# Patient Record
Sex: FEMALE | Race: OTHER | HISPANIC OR LATINO | Employment: UNEMPLOYED | URBAN - METROPOLITAN AREA
[De-identification: names, ages, dates, MRNs, and addresses within clinical notes are randomized per-mention and may not be internally consistent; named-entity substitution may affect disease eponyms.]

---

## 2020-06-26 ENCOUNTER — HOSPITAL ENCOUNTER (EMERGENCY)
Facility: HOSPITAL | Age: 30
Discharge: HOME | End: 2020-06-26
Attending: EMERGENCY MEDICINE

## 2020-06-26 VITALS
SYSTOLIC BLOOD PRESSURE: 100 MMHG | RESPIRATION RATE: 18 BRPM | HEART RATE: 58 BPM | DIASTOLIC BLOOD PRESSURE: 50 MMHG | TEMPERATURE: 99 F | OXYGEN SATURATION: 97 %

## 2020-06-26 DIAGNOSIS — S00.462A INSECT BITE (NONVENOMOUS) OF LEFT EAR, INITIAL ENCOUNTER: Primary | ICD-10-CM
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
## 2020-06-26 PROCEDURE — 99283 EMERGENCY DEPT VISIT LOW MDM: CPT

## 2020-06-26 PROCEDURE — 99283 EMERGENCY DEPT VISIT LOW MDM: CPT | Mod: 25,,, | Performed by: EMERGENCY MEDICINE

## 2020-06-26 PROCEDURE — 99283 PR EMERGENCY DEPT VISIT,LEVEL III: ICD-10-PCS | Mod: 25,,, | Performed by: EMERGENCY MEDICINE

## 2020-06-26 PROCEDURE — 69200 PR REMV EXT CANAL FOREIGN BODY: ICD-10-PCS | Mod: 53,LT,, | Performed by: EMERGENCY MEDICINE

## 2020-06-26 PROCEDURE — 25000003 PHARM REV CODE 250: Performed by: EMERGENCY MEDICINE

## 2020-06-26 PROCEDURE — 69200 CLEAR OUTER EAR CANAL: CPT | Mod: 53,LT,, | Performed by: EMERGENCY MEDICINE

## 2020-06-26 RX ORDER — OFLOXACIN 3 MG/ML
3 SOLUTION AURICULAR (OTIC) 2 TIMES DAILY
Qty: 42 DROP | Refills: 0 | Status: SHIPPED | OUTPATIENT
Start: 2020-06-26 | End: 2020-07-03

## 2020-06-26 RX ORDER — LIDOCAINE HYDROCHLORIDE 20 MG/ML
10 INJECTION, SOLUTION EPIDURAL; INFILTRATION; INTRACAUDAL; PERINEURAL
Status: COMPLETED | OUTPATIENT
Start: 2020-06-26 | End: 2020-06-26

## 2020-06-26 RX ADMIN — LIDOCAINE HYDROCHLORIDE 200 MG: 20 INJECTION, SOLUTION EPIDURAL; INFILTRATION; INTRACAUDAL; PERINEURAL at 01:06

## 2020-06-26 NOTE — ED NOTES
Patient identifiers verified and correct for Melinda Valleseros   left ear pain, pt has small insect in her ear  LOC: The patient is awake, alert and aware of environment with an appropriate affect, the patient is oriented x 3 and speaking appropriately.   APPEARANCE: Patient appears comfortable and in no acute distress, patient is clean and well groomed.  SKIN: The skin is warm and dry, color consistent with ethnicity, patient has normal skin turgor and moist mucus membranes, skin intact, no breakdown or bruising noted.   MUSCULOSKELETAL: Patient moving all extremities spontaneously, no swelling noted.  RESPIRATORY: Airway is open and patent, respirations are spontaneous, patient has a normal effort and rate, no accessory muscle use noted  CARDIACPatient has a normal rate and regular rhythm, no edema noted, capillary refill < 3 seconds.   GASTRO: Soft and non tender to palpation, no distention noted, normoactive bowel sounds present in all four quadrants. Pt states bowel movements have been regular.  : Pt denies any pain or frequency with urination.  NEURO: Pt opens eyes spontaneously, behavior appropriate to situation, follows commands, facial expression symmetrical, bilateral hand grasp equal and even, purposeful motor response noted, normal sensation in all extremities when touched with a finger.

## 2020-06-26 NOTE — ED NOTES
Patient returned to intake for discharge instructions. Given by MD Markus in [place]. Patient verbalized understanding of discharge instructions.

## 2020-06-26 NOTE — ED PROVIDER NOTES
Encounter Date: 6/26/2020    SCRIBE #1 NOTE: I, Patrizia Estradaang, am scribing for, and in the presence of,  Surya Aparicio MD. I have scribed the entire note.       History     Chief Complaint   Patient presents with    Otalgia     left ear pain since 5am, Turkish speaking pt, language line used.      29 y.o. healthy female reports around 5:30 AM this morning, she woke up with a sensation of a critter going into her left ear. No dizziness. No room spinning. No headache, nausea, or vomiting. No other complaint.     The history is provided by the patient and medical records.     Review of patient's allergies indicates:  No Known Allergies  No past medical history on file.  No past surgical history on file.  No family history on file.  Social History     Tobacco Use    Smoking status: Not on file   Substance Use Topics    Alcohol use: Not on file    Drug use: Not on file     Review of Systems  CONST: No fever, chills, weight change, or fatigue.  HEENT: + Sensation of an insect in her left ear. No headache, blurry vision/change in vision, sore throat, eye pain, otorrhea, rhinorrhea, tooth pain, swelling, or voice changes.  NECK: No pain, masses, trauma, or redness.  HEART: No pain, palpitations, or diaphoresis.  LUNG: No SOB, cough, orthopnea, DYER or other complaints.  ABDOMEN: No pain, nausea, vomiting, diarrhea, constipation, or flank pain.  : No discharge, dysuria, lesions, rashes, masses, sores.  EXTREMITIES: FROM with No swelling, redness, injuries/trauma, lesions, sores, weakness, numbness, or tingling.  NEURO: No dizziness, weakness, fatigue, tremors, headache, change in vision or disturbances of balance or coordination.  SKIN: No lesions, rashes, trauma or other complaints.    Physical Exam     Initial Vitals [06/26/20 1218]   BP Pulse Resp Temp SpO2   (!) 100/50 (!) 58 18 98.9 °F (37.2 °C) 97 %      MAP       --         Physical Exam  GENERAL: Calm; Cooperative; Well-appearing and Non-Toxic; Well-Nourished;  NAD.  HEENT: + insect foreign body in her left ear. AT/NC;   NECK: Supple, FROM with no meningismus, no accessory muscle use. No JVD or Carotid Bruits B/L.  THORAX/BACK: Atraumatic with NTTP. No midline TTP to C/T/LS spine; No CVA tenderness B/L. SLRT NEG.  HEART: Regular rate and rhythm, no M/G/T.  LUNGS: No Tachypnea, No Retractions, and CTA B/L with no W/R/R.  ABDOMEN: +BS, Soft, ND, NTTP. No rigidity. No guarding. NEG Bright's, Rovsing's, or McBurney's point tenderness.  EXTREMITIES: FROM.  SKIN: Warm, Dry, No Skin Tears or Rashes.  VASCULAR: 2+ pulses Prox/Dist & Symmetrical with No delay.  NEUROLOGIC: AAOx3; Answering questions appropriately with no focal deficit. .    ED Course   Procedures  Labs Reviewed - No data to display       Imaging Results    None          Medical Decision Making:   History:   Old Medical Records: I decided to obtain old medical records.  Initial Assessment:   Insect foreign body removal.     F/U:  After lidocaine, I attempted with a liter of sterile water to try to irrigate the insect out without success. I had also tried to remove insect with alligator without success. ENT was called.     With assistance of ENT, cockroach removed without complications.  In sick likely cause slight abrasion.  Was sent home on ofloxacin.  ____________________  Darwin Aparicio MD, [facility]  Emergency Medicine Staff  2:06 PM 6/26/2020              Scribe Attestation:   Scribe #1: I performed the above scribed service and the documentation accurately describes the services I performed. I attest to the accuracy of the note.                          Clinical Impression:   No diagnosis found.      Disposition:   Disposition: Discharged  Condition: Stable                        Surya Aparicio MD  06/28/20 1246

## 2024-02-15 ENCOUNTER — HOSPITAL ENCOUNTER (EMERGENCY)
Facility: HOSPITAL | Age: 34
Discharge: HOME | End: 2024-02-15
Attending: STUDENT IN AN ORGANIZED HEALTH CARE EDUCATION/TRAINING PROGRAM

## 2024-02-15 VITALS
OXYGEN SATURATION: 100 % | RESPIRATION RATE: 19 BRPM | WEIGHT: 136 LBS | TEMPERATURE: 98 F | HEART RATE: 53 BPM | SYSTOLIC BLOOD PRESSURE: 106 MMHG | DIASTOLIC BLOOD PRESSURE: 62 MMHG

## 2024-02-15 DIAGNOSIS — M54.50 ACUTE LEFT-SIDED LOW BACK PAIN, UNSPECIFIED WHETHER SCIATICA PRESENT: Primary | ICD-10-CM

## 2024-02-15 LAB
B-HCG UR QL: NEGATIVE
BILIRUB UR QL STRIP: NEGATIVE
CLARITY UR REFRACT.AUTO: CLEAR
COLOR UR AUTO: YELLOW
CTP QC/QA: YES
GLUCOSE UR QL STRIP: NEGATIVE
HGB UR QL STRIP: NEGATIVE
KETONES UR QL STRIP: NEGATIVE
LEUKOCYTE ESTERASE UR QL STRIP: NEGATIVE
NITRITE UR QL STRIP: NEGATIVE
PH UR STRIP: 6 [PH] (ref 5–8)
PROT UR QL STRIP: NEGATIVE
SP GR UR STRIP: 1.01 (ref 1–1.03)
URN SPEC COLLECT METH UR: NORMAL

## 2024-02-15 PROCEDURE — 81025 URINE PREGNANCY TEST: CPT | Performed by: PHYSICIAN ASSISTANT

## 2024-02-15 PROCEDURE — 25000003 PHARM REV CODE 250: Performed by: PHYSICIAN ASSISTANT

## 2024-02-15 PROCEDURE — 99283 EMERGENCY DEPT VISIT LOW MDM: CPT | Mod: 25

## 2024-02-15 PROCEDURE — 81003 URINALYSIS AUTO W/O SCOPE: CPT | Performed by: PHYSICIAN ASSISTANT

## 2024-02-15 RX ORDER — ACETAMINOPHEN 500 MG
1000 TABLET ORAL
Status: COMPLETED | OUTPATIENT
Start: 2024-02-15 | End: 2024-02-15

## 2024-02-15 RX ORDER — METHOCARBAMOL 500 MG/1
1000 TABLET, FILM COATED ORAL 3 TIMES DAILY PRN
Qty: 20 TABLET | Refills: 0 | Status: SHIPPED | OUTPATIENT
Start: 2024-02-15 | End: 2024-02-20

## 2024-02-15 RX ORDER — IBUPROFEN 600 MG/1
600 TABLET ORAL 3 TIMES DAILY PRN
Qty: 21 TABLET | Refills: 0 | Status: SHIPPED | OUTPATIENT
Start: 2024-02-15 | End: 2024-02-22

## 2024-02-15 RX ORDER — IBUPROFEN 600 MG/1
600 TABLET ORAL
Status: COMPLETED | OUTPATIENT
Start: 2024-02-15 | End: 2024-02-15

## 2024-02-15 RX ADMIN — ACETAMINOPHEN 1000 MG: 500 TABLET ORAL at 05:02

## 2024-02-15 RX ADMIN — IBUPROFEN 600 MG: 600 TABLET, FILM COATED ORAL at 05:02

## 2024-02-15 NOTE — ED PROVIDER NOTES
Encounter Date: 2/15/2024       History     Chief Complaint   Patient presents with    Back Pain     States with  she is having left sided back pain for 1 month. Endorses frequent urination. Denies CP or SOB.      The history is provided by medical records and the patient. The history is limited by a language barrier. A  was used.     Melinda Rico is a 33 y.o. female with no reported medical history presenting to the ED with the chief complaint of back pain.     Reports 1 month of L lower back pain that radiates down the front of her leg to her knee. Pain is constant and denies associated exacerbating factors. She additionally reports suprapubic abdominal pain. Denies dysuria, hematuria, urinary frequency, vaginal bleeding, vaginal discharge, STD concerns. Reports she would like to have gynecology follow-up for tubal ligation reversal. No fever. No b/b dysfunction, history of back surgeries, IVDU.    Review of patient's allergies indicates:  No Known Allergies  No past medical history on file.  No past surgical history on file.  No family history on file.     Review of Systems   Musculoskeletal:  Positive for back pain.       Physical Exam     Initial Vitals [02/15/24 1401]   BP Pulse Resp Temp SpO2   108/76 70 16 98.2 °F (36.8 °C) 100 %      MAP       --         Physical Exam    Constitutional: She appears well-developed and well-nourished. She is not diaphoretic. No distress.   HENT:   Head: Normocephalic and atraumatic.   Mouth/Throat: Oropharynx is clear and moist. No oropharyngeal exudate.   Eyes: Conjunctivae and EOM are normal. Pupils are equal, round, and reactive to light. No scleral icterus.   Neck: Neck supple.   Normal range of motion.  Cardiovascular:  Normal rate and regular rhythm.           Pulmonary/Chest: Breath sounds normal. No respiratory distress. She has no wheezes.   Abdominal: Abdomen is soft. She exhibits no distension. There is no abdominal  tenderness.   No CVA tenderness There is no rebound.   Genitourinary:    Genitourinary Comments: Patient deferred  exam     Musculoskeletal:         General: No tenderness or edema. Normal range of motion.      Cervical back: Normal range of motion and neck supple.     Neurological: She is alert and oriented to person, place, and time. She has normal strength. No sensory deficit.   +SLR LLE  No focal weakness or sensory deficit   Skin: Skin is warm and dry. No rash noted. No erythema.   Psychiatric: She has a normal mood and affect.         ED Course   Procedures  Labs Reviewed   URINALYSIS, REFLEX TO URINE CULTURE    Narrative:     Specimen Source->Urine   POCT URINE PREGNANCY          Imaging Results              X-Ray Lumbar Spine Ap And Lateral (Final result)  Result time 02/15/24 19:46:03      Final result by Markel Wilcox MD (02/15/24 19:46:03)                   Impression:      No acute process.      Electronically signed by: Markel Wilcox MD  Date:    02/15/2024  Time:    19:46               Narrative:    EXAMINATION:  XR LUMBAR SPINE AP AND LATERAL    CLINICAL HISTORY:  lower back pain;    TECHNIQUE:  AP, lateral and spot images were performed of the lumbar spine.    COMPARISON:  None    FINDINGS:  The lumbar alignment is within normal limits.  The vertebral body heights are maintained.  The posterior elements are unremarkable.  The intervertebral disc spaces are within normal limits.    There is no acute fracture or listhesis of the lumbar spine.    The paraspinal soft tissues are within normal limits.                                       Medications   ibuprofen tablet 600 mg (600 mg Oral Given 2/15/24 1715)   acetaminophen tablet 1,000 mg (1,000 mg Oral Given 2/15/24 1740)     Medical Decision Making  33 y.o. female with no reported medical history presenting to the ED c/o 1 month of L lower back pain.     Clinical presentation for back pain is most consistent with lumbar radiculopathy. No red flags  concerning for cauda equina, epidural abscess, ruptured AAA. Will check UA for suprapubic abdominal pain for evaluation of UTI. Denies other  complaints and defers pelvic exam. No hematuria or flank pain and lower suspicion for nephrolithiasis.     Amount and/or Complexity of Data Reviewed  Labs: ordered. Decision-making details documented in ED Course.  Radiology: ordered and independent interpretation performed.    Risk  OTC drugs.  Prescription drug management.       APC / Resident Notes:   XR L spine without acute findings. UA negative for UTI or occult blood. Okay for outpatient management. RX for IBU and Robaxin provided. Outpatient resources provided for f/u. Patient expresses understanding and agreeable to the plan. Return to ED precautions given for new, worsening, or concerning symptoms.                                Clinical Impression:  Final diagnoses:  [M54.50] Acute left-sided low back pain, unspecified whether sciatica present (Primary)          ED Disposition Condition    Discharge Stable          ED Prescriptions       Medication Sig Dispense Start Date End Date Auth. Provider    ibuprofen (ADVIL,MOTRIN) 600 MG tablet Take 1 tablet (600 mg total) by mouth 3 (three) times daily as needed for Pain. 21 tablet 2/15/2024 2/22/2024 Patrick Patel PA-C    methocarbamoL (ROBAXIN) 500 MG Tab Take 2 tablets (1,000 mg total) by mouth 3 (three) times daily as needed. 20 tablet 2/15/2024 2/20/2024 Patrick Patel PA-C          Follow-up Information       Follow up With Specialties Details Why Contact [facility] Surgical Oncology, Orthopedic Surgery, Genetics, Physical Medicine and Rehabilitation, Occupational Therapy, Radiology   [address] [address]  [phone]      [facility]  Call   [address] [address]             Patrick Patel PA-C  02/15/24 9487

## 2024-02-15 NOTE — ED TRIAGE NOTES
Reports L side back pain. Denies n/v    LOC: The patient is awake, alert and aware of environment with an appropriate affect, the patient is oriented x 3 and speaking appropriately.  APPEARANCE: Patient resting comfortably and in no acute distress, patient is clean and well groomed, patient's clothing is properly fastened.  SKIN: The skin is warm and dry, color consistent with ethnicity, patient has normal skin turgor and moist mucus membranes, skin intact, no breakdown or bruising noted.  MUSCULOSKELETAL: Patient moving all extremities spontaneously, no obvious swelling or deformities noted.  RESPIRATORY: Airway is open and patent, respirations are spontaneous, patient has a normal effort and rate, no accessory muscle use noted,  CARDIAC: Patient has a normal rate and regular rhythm, no periphreal edema noted, capillary refill < 3 seconds.  ABDOMEN: Soft and non tender to palpation, no distention noted, normoactive bowel sounds present in all four quadrants.  NEUROLOGIC:  facial expression is symmetrical, patient moving all extremities spontaneously, normal sensation in all extremities when touched with a finger.  Follows all commands appropriately.

## 2024-02-16 NOTE — DISCHARGE INSTRUCTIONS
Utilice la siguiente información de contacto para establecer la atención con un médico de jocelyne de la shashank.  [place] el ibuprofeno y Robaxin recetados según sea necesario para el dolor.    Regrese a la palmer de emergencias si presenta síntomas nuevos, que empeoran o que son preocupantes.

## 2024-08-17 ENCOUNTER — HOSPITAL ENCOUNTER (EMERGENCY)
Facility: HOSPITAL | Age: 34
Discharge: HOME | End: 2024-08-17
Attending: EMERGENCY MEDICINE

## 2024-08-17 VITALS
SYSTOLIC BLOOD PRESSURE: 91 MMHG | TEMPERATURE: 99 F | OXYGEN SATURATION: 98 % | RESPIRATION RATE: 16 BRPM | DIASTOLIC BLOOD PRESSURE: 48 MMHG | WEIGHT: 218.69 LBS | HEIGHT: 61 IN | BODY MASS INDEX: 41.29 KG/M2 | HEART RATE: 58 BPM

## 2024-08-17 DIAGNOSIS — R51.9 NONINTRACTABLE HEADACHE, UNSPECIFIED CHRONICITY PATTERN, UNSPECIFIED HEADACHE TYPE: Primary | ICD-10-CM

## 2024-08-17 LAB
B-HCG UR QL: NEGATIVE
BILIRUB UR QL STRIP: NEGATIVE
BUN SERPL-MCNC: 5 MG/DL (ref 6–30)
CHLORIDE SERPL-SCNC: 103 MMOL/L (ref 95–110)
CLARITY UR REFRACT.AUTO: CLEAR
COLOR UR AUTO: YELLOW
CREAT SERPL-MCNC: 0.7 MG/DL (ref 0.5–1.4)
CTP QC/QA: YES
GLUCOSE SERPL-MCNC: 106 MG/DL (ref 70–110)
GLUCOSE UR QL STRIP: NEGATIVE
HCT VFR BLD CALC: 38 %PCV (ref 36–54)
HCV AB SERPL QL IA: NORMAL
HGB UR QL STRIP: NEGATIVE
HIV 1+2 AB+HIV1 P24 AG SERPL QL IA: NORMAL
KETONES UR QL STRIP: NEGATIVE
LEUKOCYTE ESTERASE UR QL STRIP: NEGATIVE
NITRITE UR QL STRIP: NEGATIVE
PH UR STRIP: 6 [PH] (ref 5–8)
POC IONIZED CALCIUM: 1.25 MMOL/L (ref 1.06–1.42)
POC TCO2 (MEASURED): 23 MMOL/L (ref 23–29)
POTASSIUM BLD-SCNC: 3.3 MMOL/L (ref 3.5–5.1)
PROT UR QL STRIP: NEGATIVE
SAMPLE: ABNORMAL
SODIUM BLD-SCNC: 140 MMOL/L (ref 136–145)
SP GR UR STRIP: 1.02 (ref 1–1.03)
URN SPEC COLLECT METH UR: NORMAL

## 2024-08-17 PROCEDURE — 81003 URINALYSIS AUTO W/O SCOPE: CPT | Performed by: PHYSICIAN ASSISTANT

## 2024-08-17 PROCEDURE — 96361 HYDRATE IV INFUSION ADD-ON: CPT

## 2024-08-17 PROCEDURE — 86803 HEPATITIS C AB TEST: CPT | Performed by: PHYSICIAN ASSISTANT

## 2024-08-17 PROCEDURE — 63600175 PHARM REV CODE 636 W HCPCS: Performed by: PHYSICIAN ASSISTANT

## 2024-08-17 PROCEDURE — 25000003 PHARM REV CODE 250: Performed by: EMERGENCY MEDICINE

## 2024-08-17 PROCEDURE — 87389 HIV-1 AG W/HIV-1&-2 AB AG IA: CPT | Performed by: PHYSICIAN ASSISTANT

## 2024-08-17 PROCEDURE — 96374 THER/PROPH/DIAG INJ IV PUSH: CPT

## 2024-08-17 PROCEDURE — 25000003 PHARM REV CODE 250: Performed by: PHYSICIAN ASSISTANT

## 2024-08-17 PROCEDURE — 99284 EMERGENCY DEPT VISIT MOD MDM: CPT | Mod: 25

## 2024-08-17 PROCEDURE — 80047 BASIC METABLC PNL IONIZED CA: CPT

## 2024-08-17 PROCEDURE — 81025 URINE PREGNANCY TEST: CPT | Performed by: PHYSICIAN ASSISTANT

## 2024-08-17 RX ORDER — POTASSIUM CHLORIDE 20 MEQ/1
40 TABLET, EXTENDED RELEASE ORAL ONCE
Status: COMPLETED | OUTPATIENT
Start: 2024-08-17 | End: 2024-08-17

## 2024-08-17 RX ORDER — POTASSIUM CHLORIDE 20 MEQ/1
40 TABLET, EXTENDED RELEASE ORAL ONCE
Status: DISCONTINUED | OUTPATIENT
Start: 2024-08-17 | End: 2024-08-17

## 2024-08-17 RX ORDER — KETOROLAC TROMETHAMINE 30 MG/ML
10 INJECTION, SOLUTION INTRAMUSCULAR; INTRAVENOUS
Status: COMPLETED | OUTPATIENT
Start: 2024-08-17 | End: 2024-08-17

## 2024-08-17 RX ORDER — ACETAMINOPHEN 500 MG
1000 TABLET ORAL
Status: COMPLETED | OUTPATIENT
Start: 2024-08-17 | End: 2024-08-17

## 2024-08-17 RX ADMIN — KETOROLAC TROMETHAMINE 10 MG: 30 INJECTION, SOLUTION INTRAMUSCULAR; INTRAVENOUS at 02:08

## 2024-08-17 RX ADMIN — POTASSIUM CHLORIDE 40 MEQ: 1500 TABLET, EXTENDED RELEASE ORAL at 02:08

## 2024-08-17 RX ADMIN — SODIUM CHLORIDE, POTASSIUM CHLORIDE, SODIUM LACTATE AND CALCIUM CHLORIDE 1000 ML: 600; 310; 30; 20 INJECTION, SOLUTION INTRAVENOUS at 02:08

## 2024-08-17 RX ADMIN — ACETAMINOPHEN 1000 MG: 500 TABLET ORAL at 02:08

## 2024-08-17 NOTE — DISCHARGE INSTRUCTIONS
Puede matthew ibuprofeno 600 mg (3 pastillas) cada 6 horas según sea necesario para el dolor.    También puedes alternar con 1000 mg de Tylenol 3 veces al día según sea necesario.   [place] un seguimiento con pool médico de atención primaria en los próximos 5 a 7 días si kathleen síntomas no mejoran.  Regrese a la palmer de emergencias de inmediato si desarrolla algún síntoma nuevo o que empeora significativamente, dione un dolor de preet que empeora, dificultad para moverse o sentir los brazos o las piernas, confusión, fiebre, vómitos o cualquier otro síntoma preocupante.    You can take the ibuprofen 600mg (3 pills) every 6 hours as needed for pain.    You can also alternate with 1000 mg of Tylenol 3 times a day as needed.   Follow-up with your primary care physician in the next 5-7 days if your symptoms are not improving.  Return to the ER immediately if you develop any new or significantly worsening symptoms such as worsening headache, difficulty moving or feeling your arms or legs, confusion, fevers, vomiting or any other worrisome symptoms

## 2024-08-17 NOTE — ED NOTES
I-STAT Chem-8+ Results:   Value Reference Range   Sodium 140 136-145 mmol/L   Potassium  3.3 3.5-5.1 mmol/L   Chloride 103  mmol/L   Ionized Calcium 1.25 1.06-1.42 mmol/L   CO2 (measured) 23 23-29 mmol/L   Glucose 106  mg/dL   BUN 5 6-30 mg/dL   Creatinine 0.7 0.5-1.4 mg/dL   Hematocrit 38 36-54%

## 2024-08-17 NOTE — ED TRIAGE NOTES
Melinda Rico, a 33 y.o. female presents to the ED w/ complaint of neck pain 2x 10/10 radiating to whole head, burning eyes, blurred vision, dizziness, AIDEN throbbing to groin. Denies hx of blood clots, blood thinners. Took ibuprofen but pain unrelieved. No pmhx, nka. Denies falls, injury, heavy lifting.    Triage note:  Chief Complaint   Patient presents with    Neck Pain     Neck pain radiating to forehead with warm sensation starting 2 days ago. Has had some dizziness, blurred vision as well. Also stating AIDEN throbbing to groin       Review of patient's allergies indicates:  No Known Allergies  No past medical history on file.

## 2024-08-17 NOTE — ED PROVIDER NOTES
"Encounter Date: 8/17/2024       History     Chief Complaint   Patient presents with    Neck Pain     Neck pain radiating to forehead with warm sensation starting 2 days ago. Has had some dizziness, blurred vision as well. Also stating AIDEN throbbing to groin       33-year-old Wallisian-speaking only female with no significant past medical history presents to the ED with various concerns.  Patient complains of pain that radiates from the left neck/occipital area to the left frontal head.  Symptoms began 2 days ago and have been constant since onset.  Has difficulty describing the quality of the pain.  She reports associated blurry vision that is intermittent and in both eyes.  Patient also complains of a "dizzy" feeling that is spinning in nature.  This only occurs with standing, is brief and resolves spontaneously.  She denies any neck stiffness, fevers, nausea, vomiting.  She has attempted treatment with ibuprofen which completely resolved her symptoms.  Denies any history of similar symptoms.     The history is provided by the patient. The history is limited by a language barrier. A  was used.     Review of patient's allergies indicates:  No Known Allergies  History reviewed. No pertinent past medical history.  History reviewed. No pertinent surgical history.  No family history on file.  Social History     Tobacco Use    Smoking status: Never    Smokeless tobacco: Never   Substance Use Topics    Alcohol use: Not Currently    Drug use: Never     Review of Systems    Physical Exam     Initial Vitals [08/17/24 1336]   BP Pulse Resp Temp SpO2   112/65 73 18 99.1 °F (37.3 °C) 100 %      MAP       --         Physical Exam    Nursing note and vitals reviewed.  Constitutional: She appears well-developed and well-nourished. She is not diaphoretic.  Non-toxic appearance. She does not appear ill. No distress.   HENT:   Head: Normocephalic and atraumatic.   Eyes: Conjunctivae and EOM are normal. Pupils are " equal, round, and reactive to light. No scleral icterus.   Neck: Neck supple.    Full passive range of motion without pain.     Cardiovascular:  Normal rate and regular rhythm.     Exam reveals no gallop and no friction rub.       No murmur heard.  Pulmonary/Chest: Effort normal and breath sounds normal. No accessory muscle usage. No tachypnea. No respiratory distress. She has no decreased breath sounds. She has no wheezes. She has no rhonchi. She has no rales.   Abdominal: She exhibits no distension.   Musculoskeletal:      Cervical back: Full passive range of motion without pain and neck supple.     Neurological: She is alert. She has normal strength. No sensory deficit.   Speech is clear and fluent.  No facial asymmetry.   Skin: No rash noted.   Psychiatric: She has a normal mood and affect. Her behavior is normal.         ED Course   Procedures  Labs Reviewed   ISTAT PROCEDURE - Abnormal       Result Value    POC Glucose 106      POC BUN 5 (*)     POC Creatinine 0.7      POC Sodium 140      POC Potassium 3.3 (*)     POC Chloride 103      POC TCO2 (MEASURED) 23      POC Ionized Calcium 1.25      POC Hematocrit 38      Sample BEV     HIV 1 / 2 ANTIBODY    HIV 1/2 Ag/Ab Non-reactive      Narrative:     Release to patient->Immediate   HEPATITIS C ANTIBODY    Hepatitis C Ab Non-reactive      Narrative:     Release to patient->Immediate   URINALYSIS, REFLEX TO URINE CULTURE    Specimen UA Urine, Clean Catch      Color, UA Yellow      Appearance, UA Clear      pH, UA 6.0      Specific Gravity, UA 1.020      Protein, UA Negative      Glucose, UA Negative      Ketones, UA Negative      Bilirubin (UA) Negative      Occult Blood UA Negative      Nitrite, UA Negative      Leukocytes, UA Negative      Narrative:     Specimen Source->Urine   POCT URINE PREGNANCY    POC Preg Test, Ur Negative       Acceptable Yes            Imaging Results    None          Medications   lactated ringers bolus 1,000 mL (0 mLs  Intravenous Stopped 8/17/24 1534)   acetaminophen tablet 1,000 mg (1,000 mg Oral Given 8/17/24 1438)   ketorolac injection 9.999 mg (9.999 mg Intravenous Given 8/17/24 1439)   potassium chloride SA CR tablet 40 mEq (40 mEq Oral Given 8/17/24 1456)     Medical Decision Making  33-year-old female presents to the ED with left-sided headache that wraps from the occiput to the frontal area with associated intermittent blurry vision and dizziness.  She is afebrile.  Vitals within normal limits.  She has a nonfocal neurologic exam.    Will check i-STAT Chem 8, provide IV fluids, analgesics for headache and reassess.     My differential diagnosis includes but is not limited to:  Migraine, tension headache, dehydration, hypoglycemia.  I have a very low suspicion for emergent intracranial process based on patient's history and exam.  I do not feel that emergent imaging is indicated today.  My highest suspicion for etiology of patient's symptoms is tension headache.    See ED course notes below.      Amount and/or Complexity of Data Reviewed  Labs: ordered. Decision-making details documented in ED Course.    Risk  OTC drugs.  Prescription drug management.               ED Course as of 08/17/24 2141   Sat Aug 17, 2024   1448 POC Potassium(!): 3.3  Mild hypokalemia.  Will give 40 mEq p.o. supplementation [EH]   1612 Patient reassessed.  Reports resolution of her symptoms after ED management.  Feel that she can be discharged in stable condition.  Advised patient to alternate ibuprofen with Tylenol as needed for headache.  Recommended PCP follow-up if symptoms are not improving over the next several days.  ED return precautions given.  Patient voiced understanding and is comfortable with plan. [EH]   1614 BP(!): 91/48  This blood pressure was taken shortly after my reassessment of the patient.  She appears well.  She is afebrile.  Her presenting symptoms are improved.  Do not feel that additional workup is indicated at this time.  [EH]      ED Course User Index  [EH] Molly Marquis PA-C                           Clinical Impression:  Final diagnoses:  [R51.9] Nonintractable headache, unspecified chronicity pattern, unspecified headache type (Primary)          ED Disposition Condition    Discharge Stable          ED Prescriptions    None       Follow-up Information    None          Molly Marquis PA-C  08/17/24 1706

## 2024-11-16 ENCOUNTER — HOSPITAL ENCOUNTER (EMERGENCY)
Facility: HOSPITAL | Age: 34
Discharge: HOME | End: 2024-11-17
Attending: STUDENT IN AN ORGANIZED HEALTH CARE EDUCATION/TRAINING PROGRAM

## 2024-11-16 DIAGNOSIS — N83.209 SIMPLE OVARIAN CYST: ICD-10-CM

## 2024-11-16 DIAGNOSIS — R10.32 LEFT LOWER QUADRANT ABDOMINAL PAIN: Primary | ICD-10-CM

## 2024-11-16 LAB
ALBUMIN SERPL BCP-MCNC: 3.6 G/DL (ref 3.5–5.2)
ALP SERPL-CCNC: 65 U/L (ref 40–150)
ALT SERPL W/O P-5'-P-CCNC: 16 U/L (ref 10–44)
ANION GAP SERPL CALC-SCNC: 9 MMOL/L (ref 8–16)
AST SERPL-CCNC: 20 U/L (ref 10–40)
B-HCG UR QL: NEGATIVE
B-HCG UR QL: NEGATIVE
BASOPHILS # BLD AUTO: 0.01 K/UL (ref 0–0.2)
BASOPHILS NFR BLD: 0.2 % (ref 0–1.9)
BILIRUB SERPL-MCNC: 0.3 MG/DL (ref 0.1–1)
BILIRUB UR QL STRIP: NEGATIVE
BUN SERPL-MCNC: 5 MG/DL (ref 6–20)
CALCIUM SERPL-MCNC: 9.2 MG/DL (ref 8.7–10.5)
CHLORIDE SERPL-SCNC: 104 MMOL/L (ref 95–110)
CLARITY UR REFRACT.AUTO: ABNORMAL
CO2 SERPL-SCNC: 25 MMOL/L (ref 23–29)
COLOR UR AUTO: YELLOW
CREAT SERPL-MCNC: 0.7 MG/DL (ref 0.5–1.4)
CTP QC/QA: YES
DIFFERENTIAL METHOD BLD: NORMAL
EOSINOPHIL # BLD AUTO: 0 K/UL (ref 0–0.5)
EOSINOPHIL NFR BLD: 0.5 % (ref 0–8)
ERYTHROCYTE [DISTWIDTH] IN BLOOD BY AUTOMATED COUNT: 12.7 % (ref 11.5–14.5)
EST. GFR  (NO RACE VARIABLE): >60 ML/MIN/1.73 M^2
GLUCOSE SERPL-MCNC: 96 MG/DL (ref 70–110)
GLUCOSE UR QL STRIP: NEGATIVE
HCT VFR BLD AUTO: 37.7 % (ref 37–48.5)
HGB BLD-MCNC: 13.2 G/DL (ref 12–16)
HGB UR QL STRIP: NEGATIVE
IMM GRANULOCYTES # BLD AUTO: 0.01 K/UL (ref 0–0.04)
IMM GRANULOCYTES NFR BLD AUTO: 0.2 % (ref 0–0.5)
KETONES UR QL STRIP: NEGATIVE
LEUKOCYTE ESTERASE UR QL STRIP: NEGATIVE
LIPASE SERPL-CCNC: 19 U/L (ref 4–60)
LYMPHOCYTES # BLD AUTO: 1.7 K/UL (ref 1–4.8)
LYMPHOCYTES NFR BLD: 41.7 % (ref 18–48)
MCH RBC QN AUTO: 30.6 PG (ref 27–31)
MCHC RBC AUTO-ENTMCNC: 35 G/DL (ref 32–36)
MCV RBC AUTO: 88 FL (ref 82–98)
MONOCYTES # BLD AUTO: 0.4 K/UL (ref 0.3–1)
MONOCYTES NFR BLD: 9.3 % (ref 4–15)
NEUTROPHILS # BLD AUTO: 2 K/UL (ref 1.8–7.7)
NEUTROPHILS NFR BLD: 48.1 % (ref 38–73)
NITRITE UR QL STRIP: NEGATIVE
NRBC BLD-RTO: 0 /100 WBC
PH UR STRIP: 6 [PH] (ref 5–8)
PLATELET # BLD AUTO: 211 K/UL (ref 150–450)
PMV BLD AUTO: 11.2 FL (ref 9.2–12.9)
POTASSIUM SERPL-SCNC: 3.2 MMOL/L (ref 3.5–5.1)
PROT SERPL-MCNC: 7.1 G/DL (ref 6–8.4)
PROT UR QL STRIP: NEGATIVE
RBC # BLD AUTO: 4.31 M/UL (ref 4–5.4)
SODIUM SERPL-SCNC: 138 MMOL/L (ref 136–145)
SP GR UR STRIP: 1.01 (ref 1–1.03)
URN SPEC COLLECT METH UR: ABNORMAL
WBC # BLD AUTO: 4.08 K/UL (ref 3.9–12.7)

## 2024-11-16 PROCEDURE — 25000003 PHARM REV CODE 250: Performed by: STUDENT IN AN ORGANIZED HEALTH CARE EDUCATION/TRAINING PROGRAM

## 2024-11-16 PROCEDURE — 81025 URINE PREGNANCY TEST: CPT | Performed by: STUDENT IN AN ORGANIZED HEALTH CARE EDUCATION/TRAINING PROGRAM

## 2024-11-16 PROCEDURE — 99285 EMERGENCY DEPT VISIT HI MDM: CPT | Mod: 25

## 2024-11-16 PROCEDURE — 63600175 PHARM REV CODE 636 W HCPCS: Performed by: STUDENT IN AN ORGANIZED HEALTH CARE EDUCATION/TRAINING PROGRAM

## 2024-11-16 PROCEDURE — 85025 COMPLETE CBC W/AUTO DIFF WBC: CPT | Performed by: STUDENT IN AN ORGANIZED HEALTH CARE EDUCATION/TRAINING PROGRAM

## 2024-11-16 PROCEDURE — 96374 THER/PROPH/DIAG INJ IV PUSH: CPT

## 2024-11-16 PROCEDURE — 81003 URINALYSIS AUTO W/O SCOPE: CPT | Performed by: STUDENT IN AN ORGANIZED HEALTH CARE EDUCATION/TRAINING PROGRAM

## 2024-11-16 PROCEDURE — 83690 ASSAY OF LIPASE: CPT | Performed by: STUDENT IN AN ORGANIZED HEALTH CARE EDUCATION/TRAINING PROGRAM

## 2024-11-16 PROCEDURE — 80053 COMPREHEN METABOLIC PANEL: CPT | Performed by: STUDENT IN AN ORGANIZED HEALTH CARE EDUCATION/TRAINING PROGRAM

## 2024-11-16 RX ORDER — KETOROLAC TROMETHAMINE 30 MG/ML
15 INJECTION, SOLUTION INTRAMUSCULAR; INTRAVENOUS
Status: COMPLETED | OUTPATIENT
Start: 2024-11-16 | End: 2024-11-16

## 2024-11-16 RX ORDER — DICYCLOMINE HYDROCHLORIDE 10 MG/1
20 CAPSULE ORAL
Status: COMPLETED | OUTPATIENT
Start: 2024-11-16 | End: 2024-11-16

## 2024-11-16 RX ADMIN — DICYCLOMINE HYDROCHLORIDE 20 MG: 10 CAPSULE ORAL at 04:11

## 2024-11-16 RX ADMIN — KETOROLAC TROMETHAMINE 15 MG: 30 INJECTION, SOLUTION INTRAMUSCULAR; INTRAVENOUS at 09:11

## 2024-11-17 VITALS
HEART RATE: 62 BPM | DIASTOLIC BLOOD PRESSURE: 56 MMHG | WEIGHT: 218 LBS | RESPIRATION RATE: 18 BRPM | TEMPERATURE: 98 F | SYSTOLIC BLOOD PRESSURE: 107 MMHG | OXYGEN SATURATION: 99 % | BODY MASS INDEX: 41.19 KG/M2

## 2024-11-17 PROCEDURE — 25500020 PHARM REV CODE 255: Performed by: STUDENT IN AN ORGANIZED HEALTH CARE EDUCATION/TRAINING PROGRAM

## 2024-11-17 PROCEDURE — 25000003 PHARM REV CODE 250: Performed by: STUDENT IN AN ORGANIZED HEALTH CARE EDUCATION/TRAINING PROGRAM

## 2024-11-17 RX ORDER — OXYCODONE HYDROCHLORIDE 5 MG/1
5 TABLET ORAL
Status: COMPLETED | OUTPATIENT
Start: 2024-11-17 | End: 2024-11-17

## 2024-11-17 RX ADMIN — OXYCODONE 5 MG: 5 TABLET ORAL at 12:11

## 2024-11-17 RX ADMIN — IOHEXOL 100 ML: 350 INJECTION, SOLUTION INTRAVENOUS at 12:11

## 2024-11-17 NOTE — PROVIDER PROGRESS NOTES - EMERGENCY DEPT.
Encounter Date: 11/16/2024    ED Physician Progress Notes        Patient is a 34-year-old female with a significant past medical history presenting with crampy LLQ abdominal pain that started 11/15.  Her physical exam revealed left lower quadrant tenderness.  Labs and pelvic ultrasound were ordered.  Labs unremarkable.  Ultrasound with some endocervical fluid and intraperitoneal fluid that could be physiologic or could correlate with cervicitis/PID.  The ordering team felt this was less likely given patient's lack of vaginal discharge and her pelvic exam.  Given that she had a borderline temperature, a CT abdomen and pelvis was ordered for further evaluation as well    Patient was received at sign-out pending CT abdomen and pelvis.  Workup reviewed.  CT abdomen and pelvis resulted, consistent largely with some incidental findings with fibroid and the mild free fluid.  Possible enteritis.  Patient does report that she was feeling better and is ready to go home.  She was given follow up information for OBGYN and safely discharged with strict return precautions.

## 2024-11-17 NOTE — ED NOTES
Pt identifiers Melinda Alba Vallesteroschecked and correct  LOC: The patient is awake, alert, aware of environment with an appropriate affect. Oriented x3, speaking appropriately  APPEARANCE: Pt resting comfortably, in no acute distress, pt is clean and well groomed, clothing properly fastened  SKIN: Skin warm, dry and intact, normal skin turgor, moist mucus membranes  RESPIRATORY: Airway is open and patent, respirations are spontaneous, even and unlabored, normal effort and rate  CARDIAC: Normal rate and rhythm, no peripheral edema noted, capillary refill < 3 seconds, bilateral radial pulses 2+  ABDOMEN: Soft, nontender, nondistended. Bowel sounds present +LLQ abdominal pain   NEUROLOGIC: PERRL, facial expression is symmetrical, patient moving all extremities spontaneously, normal sensation in all extremities when touched with a finger.  Follows all commands appropriately  MUSCULOSKELETAL: No obvious deformities.

## 2024-11-17 NOTE — DISCHARGE INSTRUCTIONS
Diagnosis: Abdominal pain    Tests today showed:   Labs Reviewed   URINALYSIS, REFLEX TO URINE CULTURE - Abnormal       Result Value    Specimen UA Urine, Clean Catch      Color, UA Yellow      Appearance, UA Hazy (*)     pH, UA 6.0      Specific Gravity, UA 1.010      Protein, UA Negative      Glucose, UA Negative      Ketones, UA Negative      Bilirubin (UA) Negative      Occult Blood UA Negative      Nitrite, UA Negative      Leukocytes, UA Negative      Narrative:     Specimen Source->Urine   COMPREHENSIVE METABOLIC PANEL - Abnormal    Sodium 138      Potassium 3.2 (*)     Chloride 104      CO2 25      Glucose 96      BUN 5 (*)     Creatinine 0.7      Calcium 9.2      Total Protein 7.1      Albumin 3.6      Total Bilirubin 0.3      Alkaline Phosphatase 65      AST 20      ALT 16      eGFR >60.0      Anion Gap 9     PREGNANCY TEST, URINE RAPID    Preg Test, Ur Negative      Narrative:     Specimen Source->Urine   CBC W/ AUTO DIFFERENTIAL    WBC 4.08      RBC 4.31      Hemoglobin 13.2      Hematocrit 37.7      MCV 88      MCH 30.6      MCHC 35.0      RDW 12.7      Platelets 211      MPV 11.2      Immature Granulocytes 0.2      Gran # (ANC) 2.0      Immature Grans (Abs) 0.01      Lymph # 1.7      Mono # 0.4      Eos # 0.0      Baso # 0.01      nRBC 0      Gran % 48.1      Lymph % 41.7      Mono % 9.3      Eosinophil % 0.5      Basophil % 0.2      Differential Method Automated     LIPASE    Lipase 19     POCT URINE PREGNANCY    POC Preg Test, Ur Negative       Acceptable Yes       CT Abdomen Pelvis With IV Contrast NO Oral Contrast   Final Result   Abnormal      Abdomen CT and Pelvis CT:      Small bowel findings most compatible with enteritis, of numerous possible etiologies.  Correlate clinically.      Small quantity free intraperitoneal fluid.  No free intraperitoneal air or rim enhancing intraperitoneal abscess.      Endocervical fluid.  Leading DDX includes menstruation or cervicitis.  Correlate  clinically.      Uterus: 2.6 cm fundal mass, likely a fibroid.  Low-attenuation prominence of the endometrial canal, possibly related to proliferative phase endometrium, or endometrial fluid.  Correlate clinically.      Additional observations as detailed in the body of the report.      These results require clinical correlation.      This report was flagged in Epic as abnormal.         Electronically signed by: Richard Mccall   Date:    11/17/2024   Time:    03:30      US Pelvis Complete Non OB   Final Result   Abnormal         Uterus and left ovary suboptimally visualized.      Endocervical fluid and free intraperitoneal fluid in the pelvis.  This combination of findings may be physiologic (such as from menstruation).  However, in the appropriate clinical context, cervicitis and uncomplicated PID would also be among the differential diagnostic considerations.  Correlate clinically.      Simple 2.3 x 1.7 x 1.7 cm right ovarian cyst.  No sonographic evidence of right or left adnexal mass or adnexal torsion.      Polycystic ovarian morphology, including increased ovarian volume bilaterally.  Correlate clinically.      Incidentally visualized urinary bladder wall thickening, for which cystitis would be a leading differential diagnostic consideration.  Correlate clinically.      This report was flagged in Epic as abnormal.         Electronically signed by: Richard Mccall   Date:    11/16/2024   Time:    20:44          Treatments you had today:   Medications   dicyclomine capsule 20 mg (20 mg Oral Given 11/16/24 1659)   ketorolac injection 15 mg (15 mg Intravenous Given 11/16/24 2158)   oxyCODONE immediate release tablet 5 mg (5 mg Oral Given 11/17/24 0036)   iohexoL (OMNIPAQUE 350) injection 100 mL (100 mLs Intravenous Given 11/17/24 0029)       Follow-Up Plan:  - Follow-up with OBGYN.  - Follow-up with primary care doctor within 3 - 5 days  - Additional testing and/or evaluation as directed by your primary  doctor    Return to the Emergency Department for symptoms including but not limited to: worsening symptoms, shortness of breath or chest pain, vomiting with inability to hold down fluids, fevers greater than 100.4°F, passing out/fainting/unconsciousness, or other concerning symptoms.      - Seguimiento con médico de atención primaria dentro de los 3 a 5 días. - Pruebas y/o evaluaciones adicionales según lo indique pool médico de atención primaria. Regrese al Departamento de Emergencias si tiene síntomas que incluyen, entre otros: empeoramiento de los síntomas, dificultad para respirar o dolor en el pecho, vómitos con incapacidad para retener líquidos, fiebre superior a 100.4 °F, desmayos/pérdida de conocimiento u otros síntomas preocupantes.

## 2024-11-17 NOTE — ED PROVIDER NOTES
Encounter Date: 11/16/2024       History     Chief Complaint   Patient presents with    Abdominal Pain     LLQ abd pain since yesterday, denies N/V, diarrhea      The history is limited by a language barrier. A  was used.   Melinda is a 34 YOF presenting with abdominal pain.    Patient reports crampy LLQ abdominal pain that started yesterday. Pain is intermittent. Doesn't radiate to her back. No associated nausea or vomiting. Has been having normal Bms, had 2 while in the waiting room, but were normal and easy to pass. No blood or diarrhea. She denies any recent fever, cough, congestion, chest pain or difficulty breathing. No vaginal or urinary complaints. No other acute concerns.     Review of patient's allergies indicates:  No Known Allergies  History reviewed. No pertinent past medical history.  History reviewed. No pertinent surgical history.  No family history on file.  Social History     Tobacco Use    Smoking status: Never    Smokeless tobacco: Never   Substance Use Topics    Alcohol use: Not Currently    Drug use: Never     Review of Systems    Physical Exam     Initial Vitals [11/16/24 1437]   BP Pulse Resp Temp SpO2   (!) 112/57 86 18 100 °F (37.8 °C) 99 %      MAP       --         Physical Exam    Nursing note and vitals reviewed.  Constitutional: She appears well-developed and well-nourished.   HENT:   Head: Normocephalic and atraumatic.   Nose: Nose normal. Mouth/Throat: Oropharynx is clear and moist.   Eyes: Conjunctivae and EOM are normal. Pupils are equal, round, and reactive to light.   Neck:   Normal range of motion.  Cardiovascular:  Normal rate, regular rhythm, normal heart sounds and intact distal pulses.           No murmur heard.  Pulmonary/Chest: Breath sounds normal. No respiratory distress. She has no wheezes. She has no rhonchi. She has no rales.   Abdominal: Abdomen is soft. Bowel sounds are normal. She exhibits no distension. There is abdominal tenderness (LLQ).    Musculoskeletal:         General: Normal range of motion.      Cervical back: Normal range of motion.     Neurological: She is alert and oriented to person, place, and time.   Skin: Skin is warm and dry. Capillary refill takes less than 2 seconds.   Psychiatric: She has a normal mood and affect.         ED Course   Procedures  Labs Reviewed   URINALYSIS, REFLEX TO URINE CULTURE - Abnormal       Result Value    Specimen UA Urine, Clean Catch      Color, UA Yellow      Appearance, UA Hazy (*)     pH, UA 6.0      Specific Gravity, UA 1.010      Protein, UA Negative      Glucose, UA Negative      Ketones, UA Negative      Bilirubin (UA) Negative      Occult Blood UA Negative      Nitrite, UA Negative      Leukocytes, UA Negative      Narrative:     Specimen Source->Urine   COMPREHENSIVE METABOLIC PANEL - Abnormal    Sodium 138      Potassium 3.2 (*)     Chloride 104      CO2 25      Glucose 96      BUN 5 (*)     Creatinine 0.7      Calcium 9.2      Total Protein 7.1      Albumin 3.6      Total Bilirubin 0.3      Alkaline Phosphatase 65      AST 20      ALT 16      eGFR >60.0      Anion Gap 9     PREGNANCY TEST, URINE RAPID    Preg Test, Ur Negative      Narrative:     Specimen Source->Urine   CBC W/ AUTO DIFFERENTIAL    WBC 4.08      RBC 4.31      Hemoglobin 13.2      Hematocrit 37.7      MCV 88      MCH 30.6      MCHC 35.0      RDW 12.7      Platelets 211      MPV 11.2      Immature Granulocytes 0.2      Gran # (ANC) 2.0      Immature Grans (Abs) 0.01      Lymph # 1.7      Mono # 0.4      Eos # 0.0      Baso # 0.01      nRBC 0      Gran % 48.1      Lymph % 41.7      Mono % 9.3      Eosinophil % 0.5      Basophil % 0.2      Differential Method Automated     LIPASE    Lipase 19     POCT URINE PREGNANCY    POC Preg Test, Ur Negative       Acceptable Yes            Imaging Results              CT Abdomen Pelvis With IV Contrast NO Oral Contrast (In process)                       US Pelvis Complete Non OB  "(Final result)  Result time 11/16/24 20:44:17      Final result by Richard Mcacll MD (11/16/24 20:44:17)                   Impression:        Uterus and left ovary suboptimally visualized.    Endocervical fluid and free intraperitoneal fluid in the pelvis.  This combination of findings may be physiologic (such as from menstruation).  However, in the appropriate clinical context, cervicitis and uncomplicated PID would also be among the differential diagnostic considerations.  Correlate clinically.    Simple 2.3 x 1.7 x 1.7 cm right ovarian cyst.  No sonographic evidence of right or left adnexal mass or adnexal torsion.    Polycystic ovarian morphology, including increased ovarian volume bilaterally.  Correlate clinically.    Incidentally visualized urinary bladder wall thickening, for which cystitis would be a leading differential diagnostic consideration.  Correlate clinically.    This report was flagged in Epic as abnormal.      Electronically signed by: Richard Mccall  Date:    11/16/2024  Time:    20:44               Narrative:    EXAMINATION:  US PELVIS COMPLETE NON OB    CLINICAL HISTORY:  LLQ ovarian torsion w/u;    Additional history: Today's point of care urine pregnancy test is reported "Negative" under the labs tab in the electronic health record.    TECHNIQUE:  Real-time and static multiplanar grayscale imaging of the uterus and adnexa was performed via the transabdominal scanning approach.  In order to better visualize uterine and ovarian detail, multiplanar transvaginal imaging of the uterus and adnexa was then performed.    Limited color Doppler and pulsed Doppler interrogation were also applied.    COMPARISON:  Lumbar spine radiographs 02/15/2024    FINDINGS:  UTERUS:    13.4 x 5.3 x 5.3 cm.    Endometrial stripe 4 mm thick on transvaginal images, but the uterus was suboptimally visualized transvaginally.  On transabdominal imaging, the fundal endometrium demonstrates a thickness of approximately " 1.3 cm.    Endocervical fluid    Cervical nabothian cysts.    2.5 x 2.7 by 2.7 cm anterior uterine mass, likely a fibroid, well-demonstrated only on transabdominal images.    RIGHT OVARY:    4.4 x 2.5 x 2.8 cm.    Calculated volume 16.1 mL *    Normal follicles, plus a 1.7 x 1.7 x 2.3 cm simple cyst.    Pulsed Doppler: Normal low-resistance intra-ovarian blood flow, with resistive index 0.47.    Color Doppler: Normal.    LEFT OVARY:    4.5 x 3.6 x 2.4 cm.    Calculated volume 20.3 mL *    Visualized transabdominally but not well seen transvaginally.    Multiple follicles.    Pulsed Doppler: Normal low-resistance intra-ovarian blood flow, with resistive index 0.47.    Color Doppler: Normal.    MISCELLANEOUS:    There is a small quantity of free intraperitoneal fluid in the posterior cul-de-sac.    Incidentally visualized portions of the urinary bladder demonstrate substantial distension.  Considering this degree of luminal distension, the imaged portions of the bladder wall appear thickened.    * [email or web address]                                       Medications   dicyclomine capsule 20 mg (20 mg Oral Given 11/16/24 1659)   ketorolac injection 15 mg (15 mg Intravenous Given 11/16/24 2158)   oxyCODONE immediate release tablet 5 mg (5 mg Oral Given 11/17/24 0036)   iohexoL (OMNIPAQUE 350) injection 100 mL (100 mLs Intravenous Given 11/17/24 0029)     Medical Decision Making  Melinda is a 34 YOF presenting with abdominal pain. Patient is hemodynamically stable on arrival. She appears comfortable. Exam notable for LLQ tenderness. Differentials at this time include diverticulitis, gastroenteritis, ovarian torsion, ovarian cyst rupture, ectopic pregnancy, pancreatitis, gastritis, nephrolithiasis also considered PID though no vaginal discharge or fever. Plan to get cbc,cmp, lipase, UA, u preg, and pelvic US.    Blood work grossly unremarkable. Neg pregnancy. US shows simple cyst, but no sign of  torsion. Considered possible intermittent torsion, though patient endorses pain during the exam so less likely. Some fluid in the pelvis could suggest ruptured cyst given patient appears to have PCOS. Also considered early appendicitis. Given borderline temp will add CT abd/pelvis.    At time of sign out patient is still pending CT abd/pelvis. Patient was signed out to Dr. Osorio at shift change. Anticipate discharge home with obgyn referral if CT is normal.           ED Course as of 11/17/24 0048   Sat Nov 16, 2024   1705 hCG Qualitative, Urine: Negative [AC]   1718 Urinalysis, Reflex to Urine Culture Urine, Clean Catch(!)  Unremarkable [AC]   1826 Lipase: 19 [AC]   1826 CBC auto differential  Normal [AC]   2052 Discussed findings of ultrasound with the patient, patient confirmed that while ultrasound was being obtained, patient had the left lower quadrant pain, decreasing my suspicion for ovarian torsion as cause of left lower quadrant pain.  Patient reconfirmed that she has not been having any vaginal discharge, dyspareunia, or dysuria, with no leukocytes in UA, is not concern for STI as she has been monogamous with no history of STI, less concern for PID as cause of free fluid seen in ultrasound, with no elevation in LFTs.  Continues with mild left lower quadrant tenderness palpation, no right lower quadrant tenderness to palpation, and no right lower quadrant pain with left lower quadrant palpation, less concern for early appendicitis [LF]   2059 US Pelvis Complete Non OB(!)  Endocervical fluid and free intraperitoneal fluid in the pelvis.  This combination of findings may be physiologic (such as from menstruation).  However, in the appropriate clinical context, cervicitis and uncomplicated PID would also be among the differential diagnostic considerations.  Correlate clinically.     Simple 2.3 x 1.7 x 1.7 cm right ovarian cyst.  No sonographic evidence of right or left adnexal mass or adnexal torsion.      Polycystic ovarian morphology, including increased ovarian volume bilaterally.  Correlate clinically.   [AC]      ED Course User Index  [AC] Acacia Molina MD  [LF] Danielle Pimentel MD                           Clinical Impression:  Final diagnoses:  [R10.32] Left lower quadrant abdominal pain (Primary)                 Acacia Molina MD  Resident  11/17/24 0048

## 2025-01-04 ENCOUNTER — HOSPITAL ENCOUNTER (EMERGENCY)
Facility: HOSPITAL | Age: 35
Discharge: HOME | End: 2025-01-04
Attending: EMERGENCY MEDICINE

## 2025-01-04 VITALS
TEMPERATURE: 98 F | DIASTOLIC BLOOD PRESSURE: 79 MMHG | OXYGEN SATURATION: 100 % | SYSTOLIC BLOOD PRESSURE: 121 MMHG | WEIGHT: 140 LBS | HEART RATE: 53 BPM | RESPIRATION RATE: 12 BRPM | BODY MASS INDEX: 26.45 KG/M2

## 2025-01-04 DIAGNOSIS — R51.9 ACUTE NONINTRACTABLE HEADACHE, UNSPECIFIED HEADACHE TYPE: ICD-10-CM

## 2025-01-04 DIAGNOSIS — R07.9 CHEST PAIN: ICD-10-CM

## 2025-01-04 DIAGNOSIS — M54.50 CHRONIC BILATERAL LOW BACK PAIN WITHOUT SCIATICA: ICD-10-CM

## 2025-01-04 DIAGNOSIS — G89.29 CHRONIC BILATERAL LOW BACK PAIN WITHOUT SCIATICA: ICD-10-CM

## 2025-01-04 DIAGNOSIS — R68.89 FLU-LIKE SYMPTOMS: ICD-10-CM

## 2025-01-04 DIAGNOSIS — R10.30 LOWER ABDOMINAL PAIN: Primary | ICD-10-CM

## 2025-01-04 LAB
ALBUMIN SERPL BCP-MCNC: 3.8 G/DL (ref 3.5–5.2)
ALP SERPL-CCNC: 62 U/L (ref 40–150)
ALT SERPL W/O P-5'-P-CCNC: 17 U/L (ref 10–44)
ANION GAP SERPL CALC-SCNC: 10 MMOL/L (ref 8–16)
AST SERPL-CCNC: 16 U/L (ref 10–40)
B-HCG UR QL: NEGATIVE
BACTERIA #/AREA URNS AUTO: ABNORMAL /HPF
BASOPHILS # BLD AUTO: 0.02 K/UL (ref 0–0.2)
BASOPHILS NFR BLD: 0.4 % (ref 0–1.9)
BILIRUB SERPL-MCNC: 0.4 MG/DL (ref 0.1–1)
BILIRUB UR QL STRIP: NEGATIVE
BUN SERPL-MCNC: 5 MG/DL (ref 6–20)
CALCIUM SERPL-MCNC: 9.4 MG/DL (ref 8.7–10.5)
CHLORIDE SERPL-SCNC: 106 MMOL/L (ref 95–110)
CLARITY UR REFRACT.AUTO: ABNORMAL
CO2 SERPL-SCNC: 23 MMOL/L (ref 23–29)
COLOR UR AUTO: YELLOW
CREAT SERPL-MCNC: 0.7 MG/DL (ref 0.5–1.4)
CTP QC/QA: YES
DIFFERENTIAL METHOD BLD: ABNORMAL
EOSINOPHIL # BLD AUTO: 0 K/UL (ref 0–0.5)
EOSINOPHIL NFR BLD: 0.2 % (ref 0–8)
ERYTHROCYTE [DISTWIDTH] IN BLOOD BY AUTOMATED COUNT: 12.9 % (ref 11.5–14.5)
EST. GFR  (NO RACE VARIABLE): >60 ML/MIN/1.73 M^2
GLUCOSE SERPL-MCNC: 113 MG/DL (ref 70–110)
GLUCOSE UR QL STRIP: NEGATIVE
HCT VFR BLD AUTO: 37.4 % (ref 37–48.5)
HGB BLD-MCNC: 12.4 G/DL (ref 12–16)
HGB UR QL STRIP: ABNORMAL
IMM GRANULOCYTES # BLD AUTO: 0.01 K/UL (ref 0–0.04)
IMM GRANULOCYTES NFR BLD AUTO: 0.2 % (ref 0–0.5)
INFLUENZA A, MOLECULAR: NEGATIVE
INFLUENZA B, MOLECULAR: NEGATIVE
KETONES UR QL STRIP: ABNORMAL
LEUKOCYTE ESTERASE UR QL STRIP: ABNORMAL
LIPASE SERPL-CCNC: 22 U/L (ref 4–60)
LYMPHOCYTES # BLD AUTO: 2.5 K/UL (ref 1–4.8)
LYMPHOCYTES NFR BLD: 54.7 % (ref 18–48)
MCH RBC QN AUTO: 29.7 PG (ref 27–31)
MCHC RBC AUTO-ENTMCNC: 33.2 G/DL (ref 32–36)
MCV RBC AUTO: 90 FL (ref 82–98)
MICROSCOPIC COMMENT: ABNORMAL
MONOCYTES # BLD AUTO: 0.3 K/UL (ref 0.3–1)
MONOCYTES NFR BLD: 6.2 % (ref 4–15)
NEUTROPHILS # BLD AUTO: 1.7 K/UL (ref 1.8–7.7)
NEUTROPHILS NFR BLD: 38.3 % (ref 38–73)
NITRITE UR QL STRIP: NEGATIVE
NRBC BLD-RTO: 0 /100 WBC
PH UR STRIP: 7 [PH] (ref 5–8)
PLATELET # BLD AUTO: 228 K/UL (ref 150–450)
PMV BLD AUTO: 11 FL (ref 9.2–12.9)
POTASSIUM SERPL-SCNC: 3.4 MMOL/L (ref 3.5–5.1)
PROT SERPL-MCNC: 8.1 G/DL (ref 6–8.4)
PROT UR QL STRIP: ABNORMAL
RBC # BLD AUTO: 4.17 M/UL (ref 4–5.4)
RBC #/AREA URNS AUTO: 3 /HPF (ref 0–4)
SODIUM SERPL-SCNC: 139 MMOL/L (ref 136–145)
SP GR UR STRIP: 1.02 (ref 1–1.03)
SPECIMEN SOURCE: NORMAL
SQUAMOUS #/AREA URNS AUTO: 23 /HPF
TROPONIN I SERPL DL<=0.01 NG/ML-MCNC: <3 NG/L (ref 0–14)
URN SPEC COLLECT METH UR: ABNORMAL
WBC # BLD AUTO: 4.53 K/UL (ref 3.9–12.7)
WBC #/AREA URNS AUTO: 8 /HPF (ref 0–5)

## 2025-01-04 PROCEDURE — 81025 URINE PREGNANCY TEST: CPT | Performed by: EMERGENCY MEDICINE

## 2025-01-04 PROCEDURE — 80053 COMPREHEN METABOLIC PANEL: CPT | Performed by: EMERGENCY MEDICINE

## 2025-01-04 PROCEDURE — 93005 ELECTROCARDIOGRAM TRACING: CPT

## 2025-01-04 PROCEDURE — 99285 EMERGENCY DEPT VISIT HI MDM: CPT | Mod: 25

## 2025-01-04 PROCEDURE — 93010 ELECTROCARDIOGRAM REPORT: CPT | Mod: ,,, | Performed by: INTERNAL MEDICINE

## 2025-01-04 PROCEDURE — 84484 ASSAY OF TROPONIN QUANT: CPT | Performed by: EMERGENCY MEDICINE

## 2025-01-04 PROCEDURE — 25000003 PHARM REV CODE 250: Performed by: EMERGENCY MEDICINE

## 2025-01-04 PROCEDURE — 81001 URINALYSIS AUTO W/SCOPE: CPT | Performed by: EMERGENCY MEDICINE

## 2025-01-04 PROCEDURE — 83690 ASSAY OF LIPASE: CPT | Performed by: EMERGENCY MEDICINE

## 2025-01-04 PROCEDURE — 87502 INFLUENZA DNA AMP PROBE: CPT | Performed by: EMERGENCY MEDICINE

## 2025-01-04 PROCEDURE — 85025 COMPLETE CBC W/AUTO DIFF WBC: CPT | Performed by: EMERGENCY MEDICINE

## 2025-01-04 RX ORDER — ACETAMINOPHEN 500 MG
1000 TABLET ORAL
Status: COMPLETED | OUTPATIENT
Start: 2025-01-04 | End: 2025-01-04

## 2025-01-04 RX ADMIN — ACETAMINOPHEN 1000 MG: 500 TABLET ORAL at 03:01

## 2025-01-04 NOTE — ED PROVIDER NOTES
Encounter Date: 1/4/2025       History     Chief Complaint   Patient presents with    Abdominal Pain     Belly pain chest pain and back pain and headache x 2 days. Went to another hospital and says insurance did not cover that hospital so decided to come back here. No nausea no vomiting. Also states she has been shaking     Pt is a 33 yo F who presents with headache, chest pain, congestion for the past 2 days  She also notes some chest and back pain with coughing  She has had chronic pelvic pain, needs referral to ob-gyn bc last one she notes called and said they didn't take her insurance  No sob  No nausea, no vomiting    The history is provided by the patient. The history is limited by a language barrier. A  was used.     Review of patient's allergies indicates:  No Known Allergies  History reviewed. No pertinent past medical history.  History reviewed. No pertinent surgical history.  No family history on file.  Social History     Tobacco Use    Smoking status: Never    Smokeless tobacco: Never   Substance Use Topics    Alcohol use: Not Currently    Drug use: Never         Physical Exam     Initial Vitals [01/04/25 1432]   BP Pulse Resp Temp SpO2   110/64 68 20 98.2 °F (36.8 °C) 99 %      MAP       --         Physical Exam    Nursing note and vitals reviewed.  Constitutional: She appears well-developed and well-nourished. She is not diaphoretic. No distress.   HENT:   Head: Normocephalic and atraumatic.   Eyes: Conjunctivae and EOM are normal.   Neck: Neck supple.   Normal range of motion.  Cardiovascular:  Normal rate and regular rhythm.           Pulmonary/Chest: No respiratory distress. She has no wheezes. She exhibits no tenderness.   Abdominal: She exhibits no distension.   Musculoskeletal:         General: No edema. Normal range of motion.      Cervical back: Normal range of motion and neck supple.     Neurological: She is alert and oriented to person, place, and time. GCS score is 15.  GCS eye subscore is 4. GCS verbal subscore is 5. GCS motor subscore is 6.   Skin: Skin is warm and dry.   Psychiatric: She has a normal mood and affect. Her behavior is normal. Judgment and thought content normal.         ED Course   Procedures  Labs Reviewed   URINALYSIS, REFLEX TO URINE CULTURE - Abnormal       Result Value    Specimen UA Urine, Clean Catch      Color, UA Yellow      Appearance, UA Hazy (*)     pH, UA 7.0      Specific Gravity, UA 1.020      Protein, UA Trace (*)     Glucose, UA Negative      Ketones, UA Trace (*)     Bilirubin (UA) Negative      Occult Blood UA 3+ (*)     Nitrite, UA Negative      Leukocytes, UA Trace (*)     Narrative:     Specimen Source->Urine   CBC W/ AUTO DIFFERENTIAL - Abnormal    WBC 4.53      RBC 4.17      Hemoglobin 12.4      Hematocrit 37.4      MCV 90      MCH 29.7      MCHC 33.2      RDW 12.9      Platelets 228      MPV 11.0      Immature Granulocytes 0.2      Gran # (ANC) 1.7 (*)     Immature Grans (Abs) 0.01      Lymph # 2.5      Mono # 0.3      Eos # 0.0      Baso # 0.02      nRBC 0      Gran % 38.3      Lymph % 54.7 (*)     Mono % 6.2      Eosinophil % 0.2      Basophil % 0.4      Differential Method Automated     COMPREHENSIVE METABOLIC PANEL - Abnormal    Sodium 139      Potassium 3.4 (*)     Chloride 106      CO2 23      Glucose 113 (*)     BUN 5 (*)     Creatinine 0.7      Calcium 9.4      Total Protein 8.1      Albumin 3.8      Total Bilirubin 0.4      Alkaline Phosphatase 62      AST 16      ALT 17      eGFR >60.0      Anion Gap 10     URINALYSIS MICROSCOPIC - Abnormal    RBC, UA 3      WBC, UA 8 (*)     Bacteria Occasional      Squam Epithel, UA 23      Microscopic Comment SEE COMMENT      Narrative:     Specimen Source->Urine   INFLUENZA A & B BY MOLECULAR    Influenza A, Molecular Negative      Influenza B, Molecular Negative      Flu A & B Source NP     TROPONIN I HIGH SENSITIVITY    Troponin I High Sensitivity <3     LIPASE    Lipase 22     POCT URINE  PREGNANCY    POC Preg Test, Ur Negative       Acceptable Yes       EKG Readings: (Independently Interpreted)   Initial Reading: No STEMI. Rhythm: Normal Sinus Rhythm. Heart Rate: 61. Ectopy: No Ectopy. Conduction: Normal.     ECG Results              EKG 12-lead (Final result)        Collection Time Result Time QRS Duration [facility] QTC Calculation    01/04/25 14:35:07 01/05/25 09:07:07 80 380                     Final result by Interface, Lab In [facility] (01/05/25 09:07:13)                   Narrative:    Test Reason : R07.9,    Vent. Rate :  61 BPM     Atrial Rate :  61 BPM     P-R Int : 132 ms          QRS Dur :  80 ms      QT Int : 378 ms       P-R-T Axes :   5  51  36 degrees    QTcB Int : 380 ms    Normal sinus rhythm  Normal ECG  No previous ECGs available  Confirmed by Kev Landrum ([id number]) on 1/5/2025 9:07:05 AM    Referred By: AAAREFERRAL SELF           Confirmed By: Kev Landrum                                  Imaging Results              X-Ray Chest 1 View (Final result)  Result time 01/04/25 17:47:28      Final result by Meño Crespo MD (01/04/25 17:47:28)                   Impression:      1. Interstitial findings are likely related to accentuation from overlying habitus.  No large focal consolidation.      Electronically signed by: Meño Crespo MD  Date:    01/04/2025  Time:    17:47               Narrative:    EXAMINATION:  XR CHEST 1 VIEW    CLINICAL HISTORY:  Chest pain, unspecified    TECHNIQUE:  Single frontal view of the chest was performed.    COMPARISON:  None    FINDINGS:  The cardiomediastinal silhouette is not enlarged.  There is no pleural effusion.  The trachea is midline.  The lungs are symmetrically expanded bilaterally with coarse interstitial attenuation projected over the bilateral lower lung zones..  No large focal consolidation seen.  There is no pneumothorax.  The osseous structures are unremarkable..                                       Medications    acetaminophen tablet 1,000 mg (1,000 mg Oral Given 1/4/25 6234)     Medical Decision Making  DDX: URI, pneumonia    Work up was unremarkable, patient is very well appearing  Placed a referral to obgyn at [facility] and also could go to [facility]    Discharged to home in stable condition, return to ED warnings given, follow up and patient care instructions given.        Amount and/or Complexity of Data Reviewed  Labs: ordered.  Radiology: ordered.    Risk  OTC drugs.                                      Clinical Impression:  Final diagnoses:  [R07.9] Chest pain  [R10.30] Lower abdominal pain (Primary)  [M54.50, G89.29] Chronic bilateral low back pain without sciatica  [R51.9] Acute nonintractable headache, unspecified headache type  [R68.89] Flu-like symptoms          ED Disposition Condition    Discharge Stable          ED Prescriptions    None       Follow-up Information       Follow up With Specialties Details Why Contact Info    [facility]   Main Line: [phone] Call to schedule an appointment. [address] [place] [phone]    [facility]  Schedule an appointment as soon as possible for a visit   [address] [place] [address], [address]  [place] [address]  PHONE: [phone]    [facility]   OBGYN Call the referral line at [phone] to follow up with a primary doctor.    [facility] - Women's Obstetrics, Obstetrics and Gynecology, Gynecology Schedule an appointment as soon as possible for a visit   [address] [facility] [address]  [phone]               Theresa Santana MD  01/08/25 0211

## 2025-01-04 NOTE — Clinical Note
"Melinda Saba" Trisha was seen and treated in our emergency department on 1/4/2025.  She may return to work on 01/06/2025.       If you have any questions or concerns, please don't hesitate to call.      Jaelyn Dowling MD"

## 2025-01-04 NOTE — ED TRIAGE NOTES
Melinda Rico, a 34 y.o. female presents to the ED w/ complaint of HA, lower stomach pain, Left side chest pain, and back pain, originally started 2 months ago, again 2 days ago got worse, unable to f/u after last ER visit due to know insurance    Triage note:  Chief Complaint   Patient presents with    Abdominal Pain     Belly pain chest pain and back pain and headache x 2 days. Went to another hospital and says insurance did not cover that hospital so decided to come back here. No nausea no vomiting. Also states she has been shaking     Review of patient's allergies indicates:  No Known Allergies        APPEARANCE: awake and alert in NAD. PAIN  7/10  SKIN: warm, dry and intact. No breakdown or bruising.  MUSCULOSKELETAL: Patient moving all extremities spontaneously, no obvious swelling or deformities noted. Ambulates independently. Back pain and Headache  RESPIRATORY: Denies shortness of breath.Respirations unlabored.   CARDIAC: endorses CP, 2+ distal pulses; no peripheral edema  ABDOMEN: S/ND/NT, Denies nausea  : voids spontaneously, denies difficulty  Neurologic: AAO x 4; follows commands equal strength in all extremities; denies numbness/tingling. Denies dizziness

## 2025-01-04 NOTE — DISCHARGE INSTRUCTIONS
Pool trabajo en urgencias hoy no fue nada especial. Recomiendo hacer un seguimiento con OBGYN con respecto a pool dolor pélvico crónico.    Puedes seguir con [place]. También proporcioné el número de nelson clínica donde todos los proveedores hablan español.    Pool radiografía de tórax fue amaury y pool análisis de fidencio tranquilizador.

## 2025-01-05 LAB
OHS QRS DURATION: 80 MS
OHS QTC CALCULATION: 380 MS

## 2025-01-05 NOTE — PROGRESS NOTES
Patient is a 34-year-old female who presents for headache, congestion, URI symptoms as well as some mild chest discomfort with coughing.  No shortness of breath.  Vitals reassuring.  Lab work all reviewed and reassuring.  Patient is still pending flu swab at time of changeover.  Plan for proceeding with discharge home with symptomatic treatment for URI after flu swab results.     Of note, patient also referred to gynecology for fundal mass likely representing a fibroid that was seen on prior CT in November.  Patient was unable to get him with gynecology previously so referral sent by my colleague already.  Patient has reassuring abdominal exam in his not having any pelvic or abdominal pain currently. No  sx at this time.     I follow up the patient's flu swab which is negative.  Still suspect viral illness.  Patient counseled on supportive care for home.  She was counseled on return precautions prior to discharge.  She was also advised to follow with OBGYN for the CT findings previously found in November.

## 2025-01-05 NOTE — PLAN OF CARE
[facility] faxed referral to [facility] [phone]      Sandra Olson CD, MSW, LMSW, RSW   Case Management  [facility]  Email: [email or web address]